# Patient Record
Sex: FEMALE | Race: OTHER | NOT HISPANIC OR LATINO | Employment: UNEMPLOYED | URBAN - METROPOLITAN AREA
[De-identification: names, ages, dates, MRNs, and addresses within clinical notes are randomized per-mention and may not be internally consistent; named-entity substitution may affect disease eponyms.]

---

## 2024-05-23 ENCOUNTER — OFFICE VISIT (OUTPATIENT)
Dept: PRIMARY CARE | Facility: CLINIC | Age: 34
End: 2024-05-23
Payer: COMMERCIAL

## 2024-05-23 VITALS
DIASTOLIC BLOOD PRESSURE: 70 MMHG | HEART RATE: 98 BPM | WEIGHT: 206.5 LBS | TEMPERATURE: 98.7 F | SYSTOLIC BLOOD PRESSURE: 108 MMHG | BODY MASS INDEX: 35.26 KG/M2 | HEIGHT: 64 IN

## 2024-05-23 DIAGNOSIS — L91.8 CUTANEOUS TAG: ICD-10-CM

## 2024-05-23 DIAGNOSIS — Z76.89 ESTABLISHING CARE WITH NEW DOCTOR, ENCOUNTER FOR: Primary | ICD-10-CM

## 2024-05-23 DIAGNOSIS — Z30.011 BCP (BIRTH CONTROL PILLS) INITIATION: ICD-10-CM

## 2024-05-23 PROCEDURE — 1036F TOBACCO NON-USER: CPT | Performed by: INTERNAL MEDICINE

## 2024-05-23 PROCEDURE — 36415 COLL VENOUS BLD VENIPUNCTURE: CPT

## 2024-05-23 PROCEDURE — 85027 COMPLETE CBC AUTOMATED: CPT

## 2024-05-23 PROCEDURE — 84443 ASSAY THYROID STIM HORMONE: CPT

## 2024-05-23 PROCEDURE — 80053 COMPREHEN METABOLIC PANEL: CPT

## 2024-05-23 PROCEDURE — 99214 OFFICE O/P EST MOD 30 MIN: CPT | Performed by: INTERNAL MEDICINE

## 2024-05-23 PROCEDURE — 80061 LIPID PANEL: CPT

## 2024-05-23 ASSESSMENT — PATIENT HEALTH QUESTIONNAIRE - PHQ9
SUM OF ALL RESPONSES TO PHQ9 QUESTIONS 1 AND 2: 0
2. FEELING DOWN, DEPRESSED OR HOPELESS: NOT AT ALL
1. LITTLE INTEREST OR PLEASURE IN DOING THINGS: NOT AT ALL

## 2024-05-23 ASSESSMENT — ENCOUNTER SYMPTOMS
LOSS OF SENSATION IN FEET: 0
OCCASIONAL FEELINGS OF UNSTEADINESS: 0
DEPRESSION: 0

## 2024-05-23 NOTE — PROGRESS NOTES
"Subjective   Patient ID: Fatou Go is a 34 y.o. female who presents for Establish Care (Patient is here for itching of bilateral forearms x 3 weeks.).    HPI   34 years old female from [facility] originally comes in to see me for the first time in this office.  She has no past medical or surgical history.  Does not take any prescription medications.  No known allergies.  Non-smoker and no alcohol intake.  No jobs or work.   with 1 son and 2 daughters.   works for real estate.  Mother with blood pressure diabetes mellitus and high lipids.  Father with hypertension.  1 sister and 1 brother in good health.  Complaining of skin rash and itching on her arms most likely rash from the sun exposure to the sun.  Skin tags around her neck.  Needs referral to see OB/GYN  Review of Systems  12 system reviewed 12 system are negative.  Objective   /70 (BP Location: Right arm, Patient Position: Sitting, BP Cuff Size: Large adult)   Pulse 98   Temp 37.1 °C (98.7 °F) (Oral)   Ht 1.625 m (5' 3.98\")   Wt 93.7 kg (206 lb 8 oz)   BMI 35.47 kg/m²     Physical Exam  Alert oriented in no acute distress nonicteric sclera no jaundice.  Neck supple no masses no lymph nodes or thyromegaly.  Lungs clear no rales wheezing or crackles.  Heart normal S1 and S2 regular rhythm.  Abdomen benign neurologically intact  Leaving [facility] on vacation to [place].  Need to come back when she comes back from [facility] to remove her skin tags.  Referral to see OB/GYN provided  Assessment/Plan   Diagnoses and all orders for this visit:  Establishing care with new doctor, encounter for  -     CBC  -     Comprehensive Metabolic Panel  -     Lipid Panel  -     Thyroid Stimulating Hormone  BCP (birth control pills) initiation  -     Referral to Obstetrics / Gynecology; Future  Cutaneous tag         "

## 2024-05-24 LAB
ALBUMIN SERPL BCP-MCNC: 4.2 G/DL (ref 3.4–5)
ALP SERPL-CCNC: 93 U/L (ref 33–110)
ALT SERPL W P-5'-P-CCNC: 11 U/L (ref 7–45)
ANION GAP SERPL CALC-SCNC: 16 MMOL/L (ref 10–20)
AST SERPL W P-5'-P-CCNC: 13 U/L (ref 9–39)
BILIRUB SERPL-MCNC: 0.3 MG/DL (ref 0–1.2)
BUN SERPL-MCNC: 13 MG/DL (ref 6–23)
CALCIUM SERPL-MCNC: 9.1 MG/DL (ref 8.6–10.6)
CHLORIDE SERPL-SCNC: 103 MMOL/L (ref 98–107)
CHOLEST SERPL-MCNC: 177 MG/DL (ref 0–199)
CHOLESTEROL/HDL RATIO: 4
CO2 SERPL-SCNC: 23 MMOL/L (ref 21–32)
CREAT SERPL-MCNC: 0.65 MG/DL (ref 0.5–1.05)
EGFRCR SERPLBLD CKD-EPI 2021: >90 ML/MIN/1.73M*2
ERYTHROCYTE [DISTWIDTH] IN BLOOD BY AUTOMATED COUNT: 16.3 % (ref 11.5–14.5)
GLUCOSE SERPL-MCNC: 148 MG/DL (ref 74–99)
HCT VFR BLD AUTO: 35.1 % (ref 36–46)
HDLC SERPL-MCNC: 44.3 MG/DL
HGB BLD-MCNC: 10.4 G/DL (ref 12–16)
LDLC SERPL CALC-MCNC: 100 MG/DL
MCH RBC QN AUTO: 22.7 PG (ref 26–34)
MCHC RBC AUTO-ENTMCNC: 29.6 G/DL (ref 32–36)
MCV RBC AUTO: 77 FL (ref 80–100)
NON HDL CHOLESTEROL: 133 MG/DL (ref 0–149)
NRBC BLD-RTO: 0 /100 WBCS (ref 0–0)
PLATELET # BLD AUTO: 325 X10*3/UL (ref 150–450)
POTASSIUM SERPL-SCNC: 4 MMOL/L (ref 3.5–5.3)
PROT SERPL-MCNC: 7.5 G/DL (ref 6.4–8.2)
RBC # BLD AUTO: 4.58 X10*6/UL (ref 4–5.2)
SODIUM SERPL-SCNC: 138 MMOL/L (ref 136–145)
TRIGL SERPL-MCNC: 163 MG/DL (ref 0–149)
TSH SERPL-ACNC: 3.55 MIU/L (ref 0.44–3.98)
VLDL: 33 MG/DL (ref 0–40)
WBC # BLD AUTO: 10.5 X10*3/UL (ref 4.4–11.3)

## 2024-05-25 ENCOUNTER — TELEPHONE (OUTPATIENT)
Dept: PRIMARY CARE | Facility: CLINIC | Age: 34
End: 2024-05-25
Payer: COMMERCIAL

## 2024-05-25 DIAGNOSIS — D50.0 IRON DEFICIENCY ANEMIA DUE TO CHRONIC BLOOD LOSS: Primary | ICD-10-CM

## 2024-05-25 RX ORDER — FERROUS GLUCONATE 324(38)MG
38 TABLET ORAL
Qty: 270 TABLET | Refills: 3 | Status: SHIPPED | OUTPATIENT
Start: 2024-05-25 | End: 2025-05-25

## 2024-05-25 NOTE — TELEPHONE ENCOUNTER
I called the patient today with lab results and advised her to increase her iron intake 3 times a day prescription written and sent to her pharmacy today.  [facility] pharmacy in [place].  Blood sugar elevated need to be checked fasting A1c and urinalysis to rule out diabetes.  She understand that we will recheck her CBC and iron level

## 2024-05-30 ENCOUNTER — OFFICE VISIT (OUTPATIENT)
Dept: PRIMARY CARE | Facility: CLINIC | Age: 34
End: 2024-05-30
Payer: COMMERCIAL

## 2024-05-30 VITALS
OXYGEN SATURATION: 98 % | HEART RATE: 85 BPM | DIASTOLIC BLOOD PRESSURE: 73 MMHG | TEMPERATURE: 98.3 F | BODY MASS INDEX: 35.39 KG/M2 | SYSTOLIC BLOOD PRESSURE: 106 MMHG | WEIGHT: 206 LBS

## 2024-05-30 DIAGNOSIS — E11.00 TYPE 2 DIABETES MELLITUS WITH HYPEROSMOLARITY WITHOUT COMA, WITHOUT LONG-TERM CURRENT USE OF INSULIN (MULTI): Primary | ICD-10-CM

## 2024-05-30 DIAGNOSIS — D50.0 IRON DEFICIENCY ANEMIA DUE TO CHRONIC BLOOD LOSS: ICD-10-CM

## 2024-05-30 LAB
APPEARANCE UR: CLEAR
BILIRUB UR QL STRIP: NEGATIVE
COLOR UR: YELLOW
GLUCOSE UR STRIP-MCNC: NEGATIVE MG/DL
HGB UR QL STRIP: ABNORMAL
IRON SATN MFR SERPL: 8 % (ref 25–45)
IRON SERPL-MCNC: 38 UG/DL (ref 35–150)
KETONES UR STRIP-MCNC: NEGATIVE MG/DL
LEUKOCYTE ESTERASE UR QL STRIP: ABNORMAL
NITRITE UR QL STRIP: NEGATIVE
PH UR STRIP: 5 [PH]
POC FINGERSTICK BLOOD GLUCOSE: 54 MG/DL (ref 70–100)
PROT UR STRIP-MCNC: NEGATIVE MG/DL
SP GR UR STRIP.AUTO: 1.02
TIBC SERPL-MCNC: 457 UG/DL (ref 240–445)
UIBC SERPL-MCNC: 419 UG/DL (ref 110–370)
UROBILINOGEN UR STRIP-ACNC: 0.2 E.U./DL

## 2024-05-30 PROCEDURE — 81003 URINALYSIS AUTO W/O SCOPE: CPT | Performed by: INTERNAL MEDICINE

## 2024-05-30 PROCEDURE — 3078F DIAST BP <80 MM HG: CPT | Performed by: INTERNAL MEDICINE

## 2024-05-30 PROCEDURE — 1036F TOBACCO NON-USER: CPT | Performed by: INTERNAL MEDICINE

## 2024-05-30 PROCEDURE — 82962 GLUCOSE BLOOD TEST: CPT | Performed by: INTERNAL MEDICINE

## 2024-05-30 PROCEDURE — 3074F SYST BP LT 130 MM HG: CPT | Performed by: INTERNAL MEDICINE

## 2024-05-30 PROCEDURE — 83540 ASSAY OF IRON: CPT

## 2024-05-30 PROCEDURE — 83550 IRON BINDING TEST: CPT

## 2024-05-30 PROCEDURE — 99213 OFFICE O/P EST LOW 20 MIN: CPT | Performed by: INTERNAL MEDICINE

## 2024-05-30 PROCEDURE — 83036 HEMOGLOBIN GLYCOSYLATED A1C: CPT | Performed by: INTERNAL MEDICINE

## 2024-05-30 PROCEDURE — 3049F LDL-C 100-129 MG/DL: CPT | Performed by: INTERNAL MEDICINE

## 2024-05-30 PROCEDURE — 36415 COLL VENOUS BLD VENIPUNCTURE: CPT

## 2024-05-30 ASSESSMENT — PATIENT HEALTH QUESTIONNAIRE - PHQ9
2. FEELING DOWN, DEPRESSED OR HOPELESS: NOT AT ALL
1. LITTLE INTEREST OR PLEASURE IN DOING THINGS: NOT AT ALL
SUM OF ALL RESPONSES TO PHQ9 QUESTIONS 1 AND 2: 0

## 2024-05-30 ASSESSMENT — ENCOUNTER SYMPTOMS
DEPRESSION: 0
OCCASIONAL FEELINGS OF UNSTEADINESS: 0
LOSS OF SENSATION IN FEET: 0

## 2024-05-30 ASSESSMENT — PAIN SCALES - GENERAL: PAINLEVEL: 0-NO PAIN

## 2024-05-30 NOTE — PROGRESS NOTES
Subjective   Patient ID: Fatou Go is a 34 y.o. female who presents for Follow-up.    HPI   34 years old female recently seen and examined comes back today to take care of her skin tags around her neck and also to recheck few things which showed up on her labs.  She was anemic and she had elevated blood sugar.  Today we will do her iron panel and check her blood sugar with urinalysis and A1c.  Review of Systems  12 system review 12 system are negative.  Objective   /73 (BP Location: Right arm, Patient Position: Sitting, BP Cuff Size: Large adult)   Pulse 85   Temp 36.8 °C (98.3 °F) (Oral)   Wt 93.4 kg (206 lb)   SpO2 98%   BMI 35.39 kg/m²     Physical Exam  Alert oriented in no acute distress nonicteric sclera no jaundice.  Neck supple no masses no lymph node no thyromegaly or jugular venous distention.  Face symmetrical.  Lungs clear no rales wheezing or crackles.  Heart normal S1 and S2 regular rhythm.  Abdomen benign neurologically intact.  I tried to cauterize her skin tags but she was not able to tolerate that procedure so after 1 trial for a minute.  Procedure was aborted and the referral to see dermatology was done  Assessment/Plan   Diagnoses and all orders for this visit:  Type 2 diabetes mellitus with hyperosmolarity without coma, without long-term current use of insulin (Multi)  -     POCT fingerstick glucose manually resulted  -     POCT UA (Automated) docked device  -     Hemoglobin A1c  Iron deficiency anemia due to chronic blood loss  -     Iron and TIBC  Other orders  -     POCT URINALYSIS AUTOMATED

## 2024-06-05 ENCOUNTER — TELEPHONE (OUTPATIENT)
Dept: PRIMARY CARE | Facility: CLINIC | Age: 34
End: 2024-06-05
Payer: COMMERCIAL

## 2024-06-05 NOTE — TELEPHONE ENCOUNTER
I called the patient yesterday on June 4 I did not do and informed her on her lab results.  She is to take iron supplement 3 times a day.  Prescription done.  She is not diabetic.  Follow-up with dermatology for the skin tag to be removed

## 2024-06-21 ENCOUNTER — HOSPITAL ENCOUNTER (EMERGENCY)
Facility: HOSPITAL | Age: 34
Discharge: HOME | End: 2024-06-21
Payer: COMMERCIAL

## 2024-06-21 ENCOUNTER — APPOINTMENT (OUTPATIENT)
Dept: RADIOLOGY | Facility: HOSPITAL | Age: 34
End: 2024-06-21
Payer: COMMERCIAL

## 2024-06-21 VITALS
OXYGEN SATURATION: 97 % | DIASTOLIC BLOOD PRESSURE: 83 MMHG | SYSTOLIC BLOOD PRESSURE: 113 MMHG | RESPIRATION RATE: 19 BRPM | BODY MASS INDEX: 35.85 KG/M2 | TEMPERATURE: 97.9 F | HEART RATE: 89 BPM | WEIGHT: 210 LBS | HEIGHT: 64 IN

## 2024-06-21 DIAGNOSIS — N30.00 ACUTE CYSTITIS WITHOUT HEMATURIA: ICD-10-CM

## 2024-06-21 DIAGNOSIS — J03.00 STREP TONSILLITIS: Primary | ICD-10-CM

## 2024-06-21 LAB
ANION GAP SERPL CALC-SCNC: 12 MMOL/L
APPEARANCE UR: CLEAR
BACTERIA #/AREA URNS AUTO: ABNORMAL /HPF
BASOPHILS # BLD AUTO: 0.06 X10*3/UL (ref 0–0.1)
BASOPHILS NFR BLD AUTO: 0.4 %
BILIRUB UR STRIP.AUTO-MCNC: NEGATIVE MG/DL
BUN SERPL-MCNC: 7 MG/DL (ref 8–25)
CALCIUM SERPL-MCNC: 8.9 MG/DL (ref 8.5–10.4)
CHLORIDE SERPL-SCNC: 102 MMOL/L (ref 97–107)
CO2 SERPL-SCNC: 23 MMOL/L (ref 24–31)
COLOR UR: ABNORMAL
CREAT SERPL-MCNC: 0.6 MG/DL (ref 0.4–1.6)
EGFRCR SERPLBLD CKD-EPI 2021: >90 ML/MIN/1.73M*2
EOSINOPHIL # BLD AUTO: 0.09 X10*3/UL (ref 0–0.7)
EOSINOPHIL NFR BLD AUTO: 0.6 %
ERYTHROCYTE [DISTWIDTH] IN BLOOD BY AUTOMATED COUNT: 17.4 % (ref 11.5–14.5)
FLUAV RNA RESP QL NAA+PROBE: NOT DETECTED
FLUBV RNA RESP QL NAA+PROBE: NOT DETECTED
GLUCOSE SERPL-MCNC: 98 MG/DL (ref 65–99)
GLUCOSE UR STRIP.AUTO-MCNC: NORMAL MG/DL
HCG UR QL IA.RAPID: NEGATIVE
HCT VFR BLD AUTO: 35 % (ref 36–46)
HGB BLD-MCNC: 10.8 G/DL (ref 12–16)
HOLD SPECIMEN: NORMAL
IMM GRANULOCYTES # BLD AUTO: 0.08 X10*3/UL (ref 0–0.7)
IMM GRANULOCYTES NFR BLD AUTO: 0.5 % (ref 0–0.9)
KETONES UR STRIP.AUTO-MCNC: NEGATIVE MG/DL
LEUKOCYTE ESTERASE UR QL STRIP.AUTO: ABNORMAL
LYMPHOCYTES # BLD AUTO: 1.22 X10*3/UL (ref 1.2–4.8)
LYMPHOCYTES NFR BLD AUTO: 8 %
MCH RBC QN AUTO: 23.6 PG (ref 26–34)
MCHC RBC AUTO-ENTMCNC: 30.9 G/DL (ref 32–36)
MCV RBC AUTO: 77 FL (ref 80–100)
MONOCYTES # BLD AUTO: 0.63 X10*3/UL (ref 0.1–1)
MONOCYTES NFR BLD AUTO: 4.1 %
MUCOUS THREADS #/AREA URNS AUTO: ABNORMAL /LPF
NEUTROPHILS # BLD AUTO: 13.25 X10*3/UL (ref 1.2–7.7)
NEUTROPHILS NFR BLD AUTO: 86.4 %
NITRITE UR QL STRIP.AUTO: NEGATIVE
NRBC BLD-RTO: 0 /100 WBCS (ref 0–0)
PH UR STRIP.AUTO: 6 [PH]
PLATELET # BLD AUTO: 296 X10*3/UL (ref 150–450)
POTASSIUM SERPL-SCNC: 3.6 MMOL/L (ref 3.4–5.1)
PROT UR STRIP.AUTO-MCNC: NEGATIVE MG/DL
RBC # BLD AUTO: 4.57 X10*6/UL (ref 4–5.2)
RBC # UR STRIP.AUTO: ABNORMAL /UL
RBC #/AREA URNS AUTO: ABNORMAL /HPF
S PYO DNA THROAT QL NAA+PROBE: DETECTED
SARS-COV-2 RNA RESP QL NAA+PROBE: NOT DETECTED
SODIUM SERPL-SCNC: 137 MMOL/L (ref 133–145)
SP GR UR STRIP.AUTO: 1.01
SQUAMOUS #/AREA URNS AUTO: ABNORMAL /HPF
UROBILINOGEN UR STRIP.AUTO-MCNC: NORMAL MG/DL
WBC # BLD AUTO: 15.3 X10*3/UL (ref 4.4–11.3)
WBC #/AREA URNS AUTO: ABNORMAL /HPF

## 2024-06-21 PROCEDURE — 80048 BASIC METABOLIC PNL TOTAL CA: CPT

## 2024-06-21 PROCEDURE — 71046 X-RAY EXAM CHEST 2 VIEWS: CPT | Performed by: RADIOLOGY

## 2024-06-21 PROCEDURE — 96361 HYDRATE IV INFUSION ADD-ON: CPT

## 2024-06-21 PROCEDURE — 87086 URINE CULTURE/COLONY COUNT: CPT | Mod: WESLAB

## 2024-06-21 PROCEDURE — 87651 STREP A DNA AMP PROBE: CPT

## 2024-06-21 PROCEDURE — 96374 THER/PROPH/DIAG INJ IV PUSH: CPT

## 2024-06-21 PROCEDURE — 2500000004 HC RX 250 GENERAL PHARMACY W/ HCPCS (ALT 636 FOR OP/ED)

## 2024-06-21 PROCEDURE — 81025 URINE PREGNANCY TEST: CPT

## 2024-06-21 PROCEDURE — 85025 COMPLETE CBC W/AUTO DIFF WBC: CPT

## 2024-06-21 PROCEDURE — 87502 INFLUENZA DNA AMP PROBE: CPT

## 2024-06-21 PROCEDURE — 96375 TX/PRO/DX INJ NEW DRUG ADDON: CPT

## 2024-06-21 PROCEDURE — 36415 COLL VENOUS BLD VENIPUNCTURE: CPT

## 2024-06-21 PROCEDURE — 99284 EMERGENCY DEPT VISIT MOD MDM: CPT | Mod: 25

## 2024-06-21 PROCEDURE — 81001 URINALYSIS AUTO W/SCOPE: CPT

## 2024-06-21 PROCEDURE — 71046 X-RAY EXAM CHEST 2 VIEWS: CPT

## 2024-06-21 RX ORDER — KETOROLAC TROMETHAMINE 30 MG/ML
15 INJECTION, SOLUTION INTRAMUSCULAR; INTRAVENOUS ONCE
Status: COMPLETED | OUTPATIENT
Start: 2024-06-21 | End: 2024-06-21

## 2024-06-21 RX ORDER — AMOXICILLIN 875 MG/1
875 TABLET, FILM COATED ORAL 2 TIMES DAILY
Qty: 14 TABLET | Refills: 0 | Status: SHIPPED | OUTPATIENT
Start: 2024-06-21 | End: 2024-06-28

## 2024-06-21 RX ORDER — DEXAMETHASONE SODIUM PHOSPHATE 100 MG/10ML
10 INJECTION INTRAMUSCULAR; INTRAVENOUS ONCE
Status: COMPLETED | OUTPATIENT
Start: 2024-06-21 | End: 2024-06-21

## 2024-06-21 ASSESSMENT — COLUMBIA-SUICIDE SEVERITY RATING SCALE - C-SSRS
1. IN THE PAST MONTH, HAVE YOU WISHED YOU WERE DEAD OR WISHED YOU COULD GO TO SLEEP AND NOT WAKE UP?: NO
2. HAVE YOU ACTUALLY HAD ANY THOUGHTS OF KILLING YOURSELF?: NO
6. HAVE YOU EVER DONE ANYTHING, STARTED TO DO ANYTHING, OR PREPARED TO DO ANYTHING TO END YOUR LIFE?: NO

## 2024-06-21 NOTE — DISCHARGE INSTRUCTIONS
Follow-up closely with primary care provider in the following 1 to 2 days for recheck of symptoms.    Take and complete course of antibiotics as prescribed for strep throat and urinary tract infection.    Increase fluids to prevent dehydration.  Continue over-the-counter cold and flu medications help with symptoms.  Continue Tylenol and ibuprofen to help with aches and pains.

## 2024-06-21 NOTE — ED PROVIDER NOTES
HPI   Chief Complaint   Patient presents with    Generalized Body Aches    Sore Throat     Presents to ED via EMS for body aches, cough and sore throat since last night.       Patient is a 34-year-old female presents emergency department for evaluation of generalized bodyaches, cough, and sore throat beginning last night.  Patient states that she is relatively healthy individual does not take any daily medications.  She states that yesterday she took some Claritin and Tylenol to help with her symptoms.  She states that it hurts to swallow and feels like her tonsils are swollen.  She has widespread body aches and all of her joints and notes that she feels just very fatigued generalized malaise.  She states that she has been coughing as well.  She states that she just feels mildly dizzy.  She denies any chest pain or shortness of breath.  She denies any fevers, nausea, vomiting, abdominal pain, urinary symptoms, changes in bowel movements.      History provided by:  Patient   used: No                        Danilo Coma Scale Score: 15                     Patient History   Past Medical History:   Diagnosis Date    Encounter for full-term uncomplicated delivery ([facility])      (spontaneous vaginal delivery)     No past surgical history on file.  No family history on file.  Social History     Tobacco Use    Smoking status: Never    Smokeless tobacco: Never   Substance Use Topics    Alcohol use: Never    Drug use: Never       Physical Exam   ED Triage Vitals [24 1110]   Temperature Heart Rate Respirations BP   36.6 °C (97.9 °F) (!) 106 19 113/83      Pulse Ox Temp Source Heart Rate Source Patient Position   97 % Temporal Monitor Sitting      BP Location FiO2 (%)     Right arm --       Physical Exam  Constitutional:       Appearance: She is well-developed.   HENT:      Head: Normocephalic and atraumatic.      Mouth/Throat:      Mouth: Mucous membranes are moist.      Pharynx: Uvula midline.       Tonsils: Tonsillar exudate present. 1+ on the right. 1+ on the left.   Cardiovascular:      Rate and Rhythm: Regular rhythm. Tachycardia present.   Pulmonary:      Effort: Pulmonary effort is normal.      Breath sounds: Normal breath sounds. No wheezing or rales.   Abdominal:      General: Bowel sounds are normal.      Palpations: Abdomen is soft.      Tenderness: There is no abdominal tenderness.   Skin:     General: Skin is warm and dry.   Neurological:      Mental Status: She is alert.         ED Course & MDM   Diagnoses as of 06/21/24 1349   Strep tonsillitis   Acute cystitis without hematuria       Medical Decision Making  Patient is a 34-year-old female presents emergency department for evaluation of generalized bodyaches, sore throat, and cough starting last night.    Lab work done today included BMP, CBC, urinalysis, urine pregnancy, strep test, and flu/COVID swabs.  Lab work with leukocytosis, strep test positive, and evidence of urinary tract infection with mild anemia.    Scans done today were interpreted/confirmed by radiologist and also interpreted by me which included chest x-ray.  Chest x-ray shows no acute cardiopulmonary process.    Medications given at today's visit include IV fluids, IV Toradol, IV Decadron    I saw this patient independently.  Patient remained stable while in the emergency department and had significant improvement of vital signs.  She feels much improved with medications and fluids given emergency department.  She able to tolerate oral intake without any difficulty.  She is oxygenating well on room air.  She does test positive today for strep throat which I suspect is the source of patient's symptoms today.  She is educated on continued symptom management and given a dose of Decadron to help.  She will be started on oral antibiotics for treatment of strep tonsillitis along with treatment of urinary tract infection.  She was educated to follow-up closely with primary care  provider in the following week for recheck of symptoms.  She is agreeable to plan and discharge at this time.    All labs, imaging, and diagnostic studies were reviewed by me and patient was counseled on clinical impression, expectations, and plan.  Patient was educated to follow-up with PCP in the following 1-2 days.  All questions from patient were answered. They elicited understanding and were agreeable to course of treatment.  Patient was discharged in stable condition and given strict return precautions.    Prescriptions given on discharge: PO amoxicillin    ** Disclaimer:  Parts of this document were written utilizing a voice to text dictation software.  Note may contain minor transcription or typographical errors that were inadvertently transcribed by the computer software.        Procedure  Procedures     Lorraine Mims PA-C  06/21/24 3674

## 2024-06-22 LAB — BACTERIA UR CULT: NORMAL

## 2024-06-25 ENCOUNTER — APPOINTMENT (OUTPATIENT)
Dept: OBSTETRICS AND GYNECOLOGY | Facility: CLINIC | Age: 34
End: 2024-06-25
Payer: COMMERCIAL

## 2024-06-25 VITALS
WEIGHT: 207 LBS | HEIGHT: 65 IN | SYSTOLIC BLOOD PRESSURE: 109 MMHG | DIASTOLIC BLOOD PRESSURE: 69 MMHG | BODY MASS INDEX: 34.49 KG/M2

## 2024-06-25 DIAGNOSIS — Z30.011 BCP (BIRTH CONTROL PILLS) INITIATION: ICD-10-CM

## 2024-06-25 DIAGNOSIS — Z30.017 NEXPLANON INSERTION: Primary | ICD-10-CM

## 2024-06-25 PROCEDURE — 99203 OFFICE O/P NEW LOW 30 MIN: CPT | Performed by: ADVANCED PRACTICE MIDWIFE

## 2024-06-25 PROCEDURE — 81025 URINE PREGNANCY TEST: CPT | Performed by: ADVANCED PRACTICE MIDWIFE

## 2024-06-25 PROCEDURE — 11981 INSERTION DRUG DLVR IMPLANT: CPT | Performed by: ADVANCED PRACTICE MIDWIFE

## 2024-06-25 NOTE — PROGRESS NOTES
"Assessment/Plan   Risks, benefits, and expected side effects of available hormonal contraception methods were discussed, including IUDs, Nexplanon, Depo-Provera, vaginal ring, contraception patch, COCs, and POPs. Non-hormonal contraception methods including copper IUD, Phexxi, barrier methods, and fertility awareness were also discussed.     Pt is interested in Nexplanon to be placed today. Denies possibility of current pregnancy. Will plan urine HCG today.       Fatou was seen today for contraception.  Diagnoses and all orders for this visit:  BCP (birth control pills) initiation  -     Referral to Obstetrics / Gynecology       SHIMON Rogers-BASHIR    Subjective   Fatou Go is a 34 y.o. female who presents for contraception counseling. She is not interested in JAMARI as she has had bad experiences in the past with weight gain and mood instability on JAMARI. She is interested in a LARC. Discussed all options. Desires Nexplanon to be placed today.     Sexual Activity: sexually active, male partners; Patient reports 1 partners in the last 12 months.    Pertinent past medical history: none.    Menstrual History:  OB History          3    Para   3    Term   3            AB        Living   3         SAB        IAB        Ectopic        Multiple        Live Births   3                Menarche age: 14  Patient's last menstrual period was 2024 (exact date).         Objective   /69   Ht 1.651 m (5' 5\")   Wt 93.9 kg (207 lb)   LMP 2024 (Exact Date)   BMI 34.45 kg/m²     Physical Exam  Constitutional:       Appearance: Normal appearance.   Cardiovascular:      Rate and Rhythm: Regular rhythm.   Pulmonary:      Effort: Pulmonary effort is normal.      Breath sounds: Normal breath sounds.   Musculoskeletal:         General: Normal range of motion.      Cervical back: Normal range of motion.   Neurological:      General: No focal deficit present.      Mental Status: She is alert and " oriented to person, place, and time.   Skin:     General: Skin is warm and dry.           Lab Review  UPT negative     Subdermal Contraceptive Implant Insertion Note  Fatou Go desires a subdermal etonogestrel contraceptive implant insertion. She has been counseled regarding the risks, benefits and alternatives to the implant. She especially understands that her menstrual periods are expected to become irregular and unpredictable throughout the time she is using the implant. She has no contraindications to the insertion. Her questions have been answered. She has fully reviewed the FDA-approved consent brochure, has signed the consent form, and wishes to proceed with the insertion today.     Current method of contraception:  no method    Patient's last menstrual period was 06/24/2024 (exact date).    Urine pregnancy test: negative    Procedure Details  The inner side of the left arm was cleaned with Betadinex3 and infiltrated with 1% lidocaine. The contraceptive suzy was inserted according to the 's instructions without complications. The suzy was palpable under the skin after the insertion. The insertion site was closed with Band-Aid and a pressure dressing was applied.     Successful insertion of nexplanon device.    Fatou was given post-insertion instructions. She understands that the implant must be removed at the end of 3 years and may be removed sooner if she wishes.    Other follow-up needed:  F/U in 3 months for annual exam with BC check     CHRIS Rogers

## 2024-06-26 LAB — PREGNANCY TEST URINE, POC: NEGATIVE

## 2024-07-30 ENCOUNTER — APPOINTMENT (OUTPATIENT)
Dept: OBSTETRICS AND GYNECOLOGY | Facility: CLINIC | Age: 34
End: 2024-07-30
Payer: COMMERCIAL

## 2024-07-30 VITALS
DIASTOLIC BLOOD PRESSURE: 67 MMHG | WEIGHT: 214 LBS | HEIGHT: 65 IN | BODY MASS INDEX: 35.65 KG/M2 | SYSTOLIC BLOOD PRESSURE: 103 MMHG

## 2024-07-30 DIAGNOSIS — Z20.2 POSSIBLE EXPOSURE TO STD: Primary | ICD-10-CM

## 2024-07-30 DIAGNOSIS — Z12.4 PAP SMEAR FOR CERVICAL CANCER SCREENING: ICD-10-CM

## 2024-07-30 PROCEDURE — 1036F TOBACCO NON-USER: CPT | Performed by: ADVANCED PRACTICE MIDWIFE

## 2024-07-30 PROCEDURE — 87491 CHLMYD TRACH DNA AMP PROBE: CPT

## 2024-07-30 PROCEDURE — 87661 TRICHOMONAS VAGINALIS AMPLIF: CPT

## 2024-07-30 PROCEDURE — 36415 COLL VENOUS BLD VENIPUNCTURE: CPT

## 2024-07-30 PROCEDURE — 99395 PREV VISIT EST AGE 18-39: CPT | Performed by: ADVANCED PRACTICE MIDWIFE

## 2024-07-30 PROCEDURE — 87591 N.GONORRHOEAE DNA AMP PROB: CPT

## 2024-07-30 PROCEDURE — 3008F BODY MASS INDEX DOCD: CPT | Performed by: ADVANCED PRACTICE MIDWIFE

## 2024-07-30 PROCEDURE — 87624 HPV HI-RISK TYP POOLED RSLT: CPT

## 2024-07-30 RX ORDER — ETONOGESTREL 68 MG/1
1 IMPLANT SUBCUTANEOUS ONCE
COMMUNITY

## 2024-07-30 ASSESSMENT — PATIENT HEALTH QUESTIONNAIRE - PHQ9
1. LITTLE INTEREST OR PLEASURE IN DOING THINGS: NOT AT ALL
2. FEELING DOWN, DEPRESSED OR HOPELESS: NOT AT ALL
SUM OF ALL RESPONSES TO PHQ9 QUESTIONS 1 AND 2: 0

## 2024-07-30 NOTE — PROGRESS NOTES
"Assessment/Plan   Problem List Items Addressed This Visit    None      Catherine Pino, APRN-CNM     Subjective   Fatou Go is a 34 y.o. female who is here for a routine exam.     Concerns today:  none    No LMP recorded. Patient has had an implant.   Periods are irregular, lasting  has nexplanon in place  days.     Sexual Activity: sexually active, male partners; Patient reports 1 partners in the last 12 months.  Pain with intercourse? occasionally  Loss of desire? No   Able to have an orgasm? Yes     History of prior STI: none    Current contraception:  nexplanon    Last pap: 2017  History of abnormal Pap smear: no  Family history of uterine or ovarian cancer: no    Last mammogram: NA  Family history of breast cancer: no  Menstrual History:  OB History          3    Para   3    Term   3            AB        Living   3         SAB        IAB        Ectopic        Multiple        Live Births   3                Menarche age: 11  No LMP recorded. Patient has had an implant.       Objective   /67   Ht 1.651 m (5' 5\")   Wt 97.1 kg (214 lb)   BMI 35.61 kg/m²         Diet: real food    Exercise: walks daily  Physical Exam  Constitutional:       Appearance: Normal appearance.   Genitourinary:      Vulva and rectum normal.   HENT:      Head: Normocephalic.      Nose: Nose normal.      Mouth/Throat:      Mouth: Mucous membranes are moist.   Eyes:      Pupils: Pupils are equal, round, and reactive to light.   Cardiovascular:      Rate and Rhythm: Normal rate.      Pulses: Normal pulses.   Pulmonary:      Effort: Pulmonary effort is normal.   Abdominal:      General: Abdomen is flat.      Palpations: Abdomen is soft.   Musculoskeletal:         General: Normal range of motion.      Cervical back: Normal range of motion and neck supple.   Neurological:      General: No focal deficit present.      Mental Status: She is alert and oriented to person, place, and time.   Skin:     General: Skin is " warm.   Psychiatric:         Mood and Affect: Mood normal.         Behavior: Behavior normal. Behavior is cooperative.   Vitals reviewed.       Pap today  STD testing ordered as requested  Nexplanon in place

## 2024-08-01 LAB
C TRACH RRNA SPEC QL NAA+PROBE: NEGATIVE
N GONORRHOEA DNA SPEC QL PROBE+SIG AMP: NEGATIVE
T VAGINALIS RRNA SPEC QL NAA+PROBE: NEGATIVE

## 2024-08-08 LAB
CYTOLOGY CMNT CVX/VAG CYTO-IMP: NORMAL
HPV HR 12 DNA GENITAL QL NAA+PROBE: NEGATIVE
HPV HR GENOTYPES PNL CVX NAA+PROBE: NEGATIVE
HPV16 DNA SPEC QL NAA+PROBE: NEGATIVE
HPV18 DNA SPEC QL NAA+PROBE: NEGATIVE
LAB AP HPV GENOTYPE QUESTION: YES
LAB AP HPV HR: NORMAL
LAB AP PAP ADDITIONAL TESTS: NORMAL
LABORATORY COMMENT REPORT: NORMAL
PATH REPORT.TOTAL CANCER: NORMAL

## 2024-12-19 ENCOUNTER — HOSPITAL ENCOUNTER (EMERGENCY)
Facility: HOSPITAL | Age: 34
Discharge: HOME | End: 2024-12-20
Payer: COMMERCIAL

## 2024-12-19 VITALS
SYSTOLIC BLOOD PRESSURE: 127 MMHG | BODY MASS INDEX: 35.65 KG/M2 | HEART RATE: 93 BPM | TEMPERATURE: 98.1 F | RESPIRATION RATE: 16 BRPM | OXYGEN SATURATION: 98 % | DIASTOLIC BLOOD PRESSURE: 77 MMHG | WEIGHT: 214 LBS | HEIGHT: 65 IN

## 2024-12-19 DIAGNOSIS — R10.9 RIGHT LATERAL ABDOMINAL PAIN: Primary | ICD-10-CM

## 2024-12-19 LAB — PREGNANCY TEST URINE, POC: NEGATIVE

## 2024-12-19 PROCEDURE — 99285 EMERGENCY DEPT VISIT HI MDM: CPT

## 2024-12-19 PROCEDURE — 83690 ASSAY OF LIPASE: CPT

## 2024-12-19 PROCEDURE — 85025 COMPLETE CBC W/AUTO DIFF WBC: CPT

## 2024-12-19 PROCEDURE — 2500000001 HC RX 250 WO HCPCS SELF ADMINISTERED DRUGS (ALT 637 FOR MEDICARE OP): Mod: SE

## 2024-12-19 PROCEDURE — 80053 COMPREHEN METABOLIC PANEL: CPT

## 2024-12-19 PROCEDURE — 36415 COLL VENOUS BLD VENIPUNCTURE: CPT

## 2024-12-19 PROCEDURE — 87077 CULTURE AEROBIC IDENTIFY: CPT

## 2024-12-19 PROCEDURE — 81025 URINE PREGNANCY TEST: CPT

## 2024-12-19 PROCEDURE — 81001 URINALYSIS AUTO W/SCOPE: CPT

## 2024-12-19 RX ORDER — DICYCLOMINE HYDROCHLORIDE 10 MG/1
10 CAPSULE ORAL ONCE
Status: COMPLETED | OUTPATIENT
Start: 2024-12-19 | End: 2024-12-19

## 2024-12-19 RX ADMIN — DICYCLOMINE HYDROCHLORIDE 10 MG: 10 CAPSULE ORAL at 23:28

## 2024-12-19 ASSESSMENT — PAIN - FUNCTIONAL ASSESSMENT: PAIN_FUNCTIONAL_ASSESSMENT: 0-10

## 2024-12-19 ASSESSMENT — LIFESTYLE VARIABLES
HAVE YOU EVER FELT YOU SHOULD CUT DOWN ON YOUR DRINKING: NO
EVER HAD A DRINK FIRST THING IN THE MORNING TO STEADY YOUR NERVES TO GET RID OF A HANGOVER: NO
HAVE PEOPLE ANNOYED YOU BY CRITICIZING YOUR DRINKING: NO
TOTAL SCORE: 0
EVER FELT BAD OR GUILTY ABOUT YOUR DRINKING: NO

## 2024-12-19 ASSESSMENT — PAIN SCALES - GENERAL: PAINLEVEL_OUTOF10: 7

## 2024-12-19 ASSESSMENT — COLUMBIA-SUICIDE SEVERITY RATING SCALE - C-SSRS
6. HAVE YOU EVER DONE ANYTHING, STARTED TO DO ANYTHING, OR PREPARED TO DO ANYTHING TO END YOUR LIFE?: NO
2. HAVE YOU ACTUALLY HAD ANY THOUGHTS OF KILLING YOURSELF?: NO
1. IN THE PAST MONTH, HAVE YOU WISHED YOU WERE DEAD OR WISHED YOU COULD GO TO SLEEP AND NOT WAKE UP?: NO

## 2024-12-19 ASSESSMENT — PAIN DESCRIPTION - LOCATION: LOCATION: OTHER (COMMENT)

## 2024-12-19 ASSESSMENT — PAIN DESCRIPTION - ORIENTATION: ORIENTATION: RIGHT

## 2024-12-19 ASSESSMENT — PAIN DESCRIPTION - DESCRIPTORS: DESCRIPTORS: OTHER (COMMENT)

## 2024-12-19 ASSESSMENT — PAIN DESCRIPTION - FREQUENCY: FREQUENCY: CONSTANT/CONTINUOUS

## 2024-12-20 ENCOUNTER — APPOINTMENT (OUTPATIENT)
Dept: RADIOLOGY | Facility: HOSPITAL | Age: 34
End: 2024-12-20
Payer: COMMERCIAL

## 2024-12-20 LAB
ALBUMIN SERPL BCP-MCNC: 4.1 G/DL (ref 3.4–5)
ALP SERPL-CCNC: 82 U/L (ref 33–110)
ALT SERPL W P-5'-P-CCNC: 9 U/L (ref 7–45)
ANION GAP SERPL CALC-SCNC: 9 MMOL/L (ref 10–20)
APPEARANCE UR: ABNORMAL
AST SERPL W P-5'-P-CCNC: 11 U/L (ref 9–39)
BASOPHILS # BLD AUTO: 0.06 X10*3/UL (ref 0–0.1)
BASOPHILS NFR BLD AUTO: 0.5 %
BILIRUB SERPL-MCNC: 0.3 MG/DL (ref 0–1.2)
BILIRUB UR STRIP.AUTO-MCNC: NEGATIVE MG/DL
BUN SERPL-MCNC: 9 MG/DL (ref 6–23)
CALCIUM SERPL-MCNC: 9 MG/DL (ref 8.6–10.6)
CHLORIDE SERPL-SCNC: 105 MMOL/L (ref 98–107)
CO2 SERPL-SCNC: 26 MMOL/L (ref 21–32)
COLOR UR: YELLOW
CREAT SERPL-MCNC: 0.7 MG/DL (ref 0.5–1.05)
EGFRCR SERPLBLD CKD-EPI 2021: >90 ML/MIN/1.73M*2
EOSINOPHIL # BLD AUTO: 0.19 X10*3/UL (ref 0–0.7)
EOSINOPHIL NFR BLD AUTO: 1.4 %
ERYTHROCYTE [DISTWIDTH] IN BLOOD BY AUTOMATED COUNT: 14.9 % (ref 11.5–14.5)
GLUCOSE SERPL-MCNC: 167 MG/DL (ref 74–99)
GLUCOSE UR STRIP.AUTO-MCNC: ABNORMAL MG/DL
HCT VFR BLD AUTO: 31.7 % (ref 36–46)
HGB BLD-MCNC: 10.3 G/DL (ref 12–16)
HOLD SPECIMEN: NORMAL
HOLD SPECIMEN: NORMAL
IMM GRANULOCYTES # BLD AUTO: 0.11 X10*3/UL (ref 0–0.7)
IMM GRANULOCYTES NFR BLD AUTO: 0.8 % (ref 0–0.9)
KETONES UR STRIP.AUTO-MCNC: ABNORMAL MG/DL
LEUKOCYTE ESTERASE UR QL STRIP.AUTO: ABNORMAL
LIPASE SERPL-CCNC: 13 U/L (ref 9–82)
LYMPHOCYTES # BLD AUTO: 3.25 X10*3/UL (ref 1.2–4.8)
LYMPHOCYTES NFR BLD AUTO: 24.8 %
MCH RBC QN AUTO: 24.9 PG (ref 26–34)
MCHC RBC AUTO-ENTMCNC: 32.5 G/DL (ref 32–36)
MCV RBC AUTO: 77 FL (ref 80–100)
MONOCYTES # BLD AUTO: 0.56 X10*3/UL (ref 0.1–1)
MONOCYTES NFR BLD AUTO: 4.3 %
MUCOUS THREADS #/AREA URNS AUTO: ABNORMAL /LPF
NEUTROPHILS # BLD AUTO: 8.95 X10*3/UL (ref 1.2–7.7)
NEUTROPHILS NFR BLD AUTO: 68.2 %
NITRITE UR QL STRIP.AUTO: NEGATIVE
NRBC BLD-RTO: 0 /100 WBCS (ref 0–0)
PH UR STRIP.AUTO: 5.5 [PH]
PLATELET # BLD AUTO: 303 X10*3/UL (ref 150–450)
POTASSIUM SERPL-SCNC: 3.8 MMOL/L (ref 3.5–5.3)
PROT SERPL-MCNC: 7.1 G/DL (ref 6.4–8.2)
PROT UR STRIP.AUTO-MCNC: ABNORMAL MG/DL
RBC # BLD AUTO: 4.13 X10*6/UL (ref 4–5.2)
RBC # UR STRIP.AUTO: NEGATIVE /UL
RBC #/AREA URNS AUTO: ABNORMAL /HPF
SODIUM SERPL-SCNC: 136 MMOL/L (ref 136–145)
SP GR UR STRIP.AUTO: 1.03
SQUAMOUS #/AREA URNS AUTO: ABNORMAL /HPF
UROBILINOGEN UR STRIP.AUTO-MCNC: NORMAL MG/DL
WBC # BLD AUTO: 13.1 X10*3/UL (ref 4.4–11.3)
WBC #/AREA URNS AUTO: ABNORMAL /HPF

## 2024-12-20 PROCEDURE — 74177 CT ABD & PELVIS W/CONTRAST: CPT | Performed by: RADIOLOGY

## 2024-12-20 PROCEDURE — 74177 CT ABD & PELVIS W/CONTRAST: CPT

## 2024-12-20 PROCEDURE — 2550000001 HC RX 255 CONTRASTS: Mod: SE

## 2024-12-20 RX ORDER — DICYCLOMINE HYDROCHLORIDE 10 MG/1
10 CAPSULE ORAL 3 TIMES DAILY PRN
Qty: 15 CAPSULE | Refills: 0 | Status: SHIPPED | OUTPATIENT
Start: 2024-12-20 | End: 2024-12-20

## 2024-12-20 RX ORDER — DICYCLOMINE HYDROCHLORIDE 10 MG/1
10 CAPSULE ORAL 3 TIMES DAILY PRN
Qty: 15 CAPSULE | Refills: 0 | Status: SHIPPED | OUTPATIENT
Start: 2024-12-20

## 2024-12-20 RX ADMIN — IOHEXOL 90 ML: 350 INJECTION, SOLUTION INTRAVENOUS at 00:32

## 2024-12-20 NOTE — DISCHARGE INSTRUCTIONS
Labs look okay.  CT scan is negative for any acute findings for abdominal pain  Trial of the Bentyl to see if it helps with your pain.  If symptoms persist, follow-up with primary care

## 2024-12-20 NOTE — ED PROVIDER NOTES
HPI   Chief Complaint   Patient presents with    Flank Pain       34-year-old female with no significant past medical history presenting today for right sided abdominal pain.  He notes that has been present for 2 weeks waxing waning but over the past 5 days, has dramatically gotten worse.  Describes it as a pulsating cramping pain to the right side of her abdomen.  Reports that in the right upper and right lower abdomen.  Denies any nausea or vomiting.  Denies diarrhea or constipation.  Last bowel movement this morning.  Reports fever, chills, chest pain, shortness of breath, urinary symptoms including dysuria, hematuria or increased urinary frequency.  States no sick contacts.  States that the pain is constant and worse when she lays on that side.  Denies any flank pain or back pain.              Patient History   Past Medical History:   Diagnosis Date    Encounter for full-term uncomplicated delivery ([facility])      (spontaneous vaginal delivery)     No past surgical history on file.  Family History   Problem Relation Name Age of Onset    Diabetes Mother      Hypertension Mother       Social History     Tobacco Use    Smoking status: Never    Smokeless tobacco: Never   Vaping Use    Vaping status: Never Used   Substance Use Topics    Alcohol use: Never    Drug use: Never       Physical Exam   ED Triage Vitals [24 2225]   Temperature Heart Rate Respirations BP   36.7 °C (98.1 °F) 93 16 127/77      Pulse Ox Temp src Heart Rate Source Patient Position   98 % -- Monitor Sitting      BP Location FiO2 (%)     Left arm --       Physical Exam  Vitals and nursing note reviewed.   Constitutional:       General: She is not in acute distress.     Appearance: Normal appearance. She is not ill-appearing.   HENT:      Head: Normocephalic and atraumatic.      Right Ear: External ear normal.      Left Ear: External ear normal.      Nose: Nose normal. No congestion or rhinorrhea.      Mouth/Throat:      Mouth: Mucous  membranes are moist.      Pharynx: Oropharynx is clear. No oropharyngeal exudate or posterior oropharyngeal erythema.   Eyes:      Extraocular Movements: Extraocular movements intact.      Conjunctiva/sclera: Conjunctivae normal.      Pupils: Pupils are equal, round, and reactive to light.   Cardiovascular:      Rate and Rhythm: Normal rate and regular rhythm.      Heart sounds: Normal heart sounds.   Pulmonary:      Effort: No accessory muscle usage or respiratory distress.      Breath sounds: Normal breath sounds. No wheezing, rhonchi or rales.   Abdominal:      General: Abdomen is flat. Bowel sounds are normal. There is no distension.      Palpations: Abdomen is soft.      Tenderness: There is abdominal tenderness in the right upper quadrant and right lower quadrant. There is no right CVA tenderness or left CVA tenderness.   Musculoskeletal:         General: No swelling or deformity. Normal range of motion.      Cervical back: Normal range of motion and neck supple.      Right lower leg: No edema.      Left lower leg: No edema.   Skin:     General: Skin is warm and dry.      Capillary Refill: Capillary refill takes less than 2 seconds.   Neurological:      General: No focal deficit present.      Mental Status: She is alert and oriented to person, place, and time.      GCS: GCS eye subscore is 4. GCS verbal subscore is 5. GCS motor subscore is 6.      Cranial Nerves: Cranial nerves 2-12 are intact.      Sensory: No sensory deficit.      Motor: Motor function is intact. No weakness.   Psychiatric:         Mood and Affect: Mood and affect normal.         Speech: Speech normal.         Behavior: Behavior normal. Behavior is cooperative.           ED Course & MDM   ED Course as of 12/20/24 0314   Fri Dec 20, 2024   0037 Preg Test, Ur: Negative [ML]   0057 CT abdomen pelvis w IV contrast  1. No acute abnormality within the abdomen or pelvis.   [ML]      ED Course User Index  [ML] Nava Pressley PA-C         Diagnoses as  of 12/20/24 0314   Right lateral abdominal pain                 No data recorded                                 Medical Decision Making  34-year-old female presenting today for right sided abdominal pain.  Has been ongoing for 2 weeks but worse for the past 4 to 5 days.  On exam, abdomen is tender to palpation right upper and right lower abdomen.  Vital signs are stable and she is afebrile.  No rigidity or guarding.  No flank pain/CVA tenderness.  Patient denies any nausea vomiting diarrhea constipation.  Last bowel movement today.  Considering symptoms have been ongoing for the past 2 weeks, I do have lower suspicion for acute abdomen though considering she is tender to palpation, we will proceed with a CT scan of her stomach to rule out acute abdomen.  Patient was given Bentyl for pain control.    CBC with white count but no acute anemia noted.  CMP without electrolyte abnormalities, transaminitis or acute anemia.  UA showing leukocyte Estrace and trace white blood cells in urine.  No nitrites.  Denies any symptoms.  Will defer treatment until culture comes back.  Lipase within normal limits.  Patient's pregnancy test is negative.  CT scan of abdomen pelvis stat for hepatomegaly.  LFTs within normal limits.  Plan for this patient to be discharged home with Bentyl prescription and follow-up with PCP.        Procedure  Procedures     Nava Pressley PA-C  12/20/24 0314

## 2024-12-20 NOTE — ED TRIAGE NOTES
Patient reports right flank pain that has been going on for 2 weeks. Describes it as 7/10 constant pulsating. Denies any N/V/D, fevers, chills, CP, SOB. Also denies any urinary symptoms.

## 2024-12-21 LAB — BACTERIA UR CULT: ABNORMAL

## 2024-12-23 LAB — BACTERIA UR CULT: ABNORMAL

## 2024-12-24 ENCOUNTER — TELEPHONE (OUTPATIENT)
Dept: PHARMACY | Facility: HOSPITAL | Age: 34
End: 2024-12-24
Payer: COMMERCIAL

## 2024-12-24 NOTE — PROGRESS NOTES
EDPD Note: Rapid Result Review    I reviewed Fatou Go 's chart regarding a positive urine culture/result that was taken during their recent emergency room visit. The patient was not told about these results prior to leaving the emergency department. Therefore, patient was contacted and given appropriate education.     Patient presented to the ED for flank pain. Denied urinary symptoms. Discharged without antibiotics. At time of call, patient denied urinary symptoms. Patient was asymptomatic in ED and still reports no urinary symptoms at the time of call. Since asymptomatic UTI, patient is not indicated for antibiotic at this time. Advised patient to follow up with PCP or urgent care if urinary symptoms due arise.     Susceptibility data from last 90 days.  Collected Specimen Info Organism Ampicillin Ciprofloxacin Levofloxacin Nitrofurantoin Penicillin Trimethoprim/Sulfamethoxazole Vancomycin   12/19/24 Urine from Clean Catch/Voided Enterococcus faecalis  S  S  S  S  S  R  S     Admission on 12/19/2024, Discharged on 12/20/2024   Component Date Value Ref Range Status    WBC 12/19/2024 13.1 (H)  4.4 - 11.3 x10*3/uL Final    nRBC 12/19/2024 0.0  0.0 - 0.0 /100 WBCs Final    RBC 12/19/2024 4.13  4.00 - 5.20 x10*6/uL Final    Hemoglobin 12/19/2024 10.3 (L)  12.0 - 16.0 g/dL Final    Hematocrit 12/19/2024 31.7 (L)  36.0 - 46.0 % Final    MCV 12/19/2024 77 (L)  80 - 100 fL Final    MCH 12/19/2024 24.9 (L)  26.0 - 34.0 pg Final    MCHC 12/19/2024 32.5  32.0 - 36.0 g/dL Final    RDW 12/19/2024 14.9 (H)  11.5 - 14.5 % Final    Platelets 12/19/2024 303  150 - 450 x10*3/uL Final    Neutrophils % 12/19/2024 68.2  40.0 - 80.0 % Final    Immature Granulocytes %, Automated 12/19/2024 0.8  0.0 - 0.9 % Final    Immature Granulocyte Count (IG) includes promyelocytes, myelocytes and metamyelocytes but does not include bands. Percent differential counts (%) should be interpreted in the context of the absolute cell counts  (cells/UL).    Lymphocytes % 12/19/2024 24.8  13.0 - 44.0 % Final    Monocytes % 12/19/2024 4.3  2.0 - 10.0 % Final    Eosinophils % 12/19/2024 1.4  0.0 - 6.0 % Final    Basophils % 12/19/2024 0.5  0.0 - 2.0 % Final    Neutrophils Absolute 12/19/2024 8.95 (H)  1.20 - 7.70 x10*3/uL Final    Percent differential counts (%) should be interpreted in the context of the absolute cell counts (cells/uL).    Immature Granulocytes Absolute, Au* 12/19/2024 0.11  0.00 - 0.70 x10*3/uL Final    Lymphocytes Absolute 12/19/2024 3.25  1.20 - 4.80 x10*3/uL Final    Monocytes Absolute 12/19/2024 0.56  0.10 - 1.00 x10*3/uL Final    Eosinophils Absolute 12/19/2024 0.19  0.00 - 0.70 x10*3/uL Final    Basophils Absolute 12/19/2024 0.06  0.00 - 0.10 x10*3/uL Final    Glucose 12/19/2024 167 (H)  74 - 99 mg/dL Final    Sodium 12/19/2024 136  136 - 145 mmol/L Final    Potassium 12/19/2024 3.8  3.5 - 5.3 mmol/L Final    Chloride 12/19/2024 105  98 - 107 mmol/L Final    Bicarbonate 12/19/2024 26  21 - 32 mmol/L Final    Anion Gap 12/19/2024 9 (L)  10 - 20 mmol/L Final    Urea Nitrogen 12/19/2024 9  6 - 23 mg/dL Final    Creatinine 12/19/2024 0.70  0.50 - 1.05 mg/dL Final    eGFR 12/19/2024 >90  >60 mL/min/1.73m*2 Final    Calculations of estimated GFR are performed using the 2021 CKD-EPI Study Refit equation without the race variable for the IDMS-Traceable creatinine methods.  https://jasn.asnjournals.org/content/early/2021/09/22/ASN.5098797013    Calcium 12/19/2024 9.0  8.6 - 10.6 mg/dL Final    Albumin 12/19/2024 4.1  3.4 - 5.0 g/dL Final    Alkaline Phosphatase 12/19/2024 82  33 - 110 U/L Final    Total Protein 12/19/2024 7.1  6.4 - 8.2 g/dL Final    AST 12/19/2024 11  9 - 39 U/L Final    Bilirubin, Total 12/19/2024 0.3  0.0 - 1.2 mg/dL Final    ALT 12/19/2024 9  7 - 45 U/L Final    Patients treated with Sulfasalazine may generate falsely decreased results for ALT.    Lipase 12/19/2024 13  9 - 82 U/L Final    Preg Test, Ur 12/19/2024  Negative  Negative Final    Color, Urine 12/19/2024 Yellow  Light-Yellow, Yellow, Dark-Yellow Final    Appearance, Urine 12/19/2024 Turbid (N)  Clear Final    Specific Gravity, Urine 12/19/2024 1.029  1.005 - 1.035 Final    pH, Urine 12/19/2024 5.5  5.0, 5.5, 6.0, 6.5, 7.0, 7.5, 8.0 Final    Protein, Urine 12/19/2024 10 (TRACE)  NEGATIVE, 10 (TRACE), 20 (TRACE) mg/dL Final    Glucose, Urine 12/19/2024 30 (TRACE) (A)  Normal mg/dL Final    Blood, Urine 12/19/2024 NEGATIVE  NEGATIVE Final    Ketones, Urine 12/19/2024 TRACE (A)  NEGATIVE mg/dL Final    Bilirubin, Urine 12/19/2024 NEGATIVE  NEGATIVE Final    Urobilinogen, Urine 12/19/2024 Normal  Normal mg/dL Final    Nitrite, Urine 12/19/2024 NEGATIVE  NEGATIVE Final    Leukocyte Esterase, Urine 12/19/2024 250 Yao/µL (A)  NEGATIVE Final    Extra Tube 12/19/2024 Hold for add-ons.   Final    Auto resulted.    Extra Tube 12/20/2024 Hold for add-ons.   Final    Auto resulted.    WBC, Urine 12/19/2024 11-20 (A)  1-5, NONE /HPF Final    RBC, Urine 12/19/2024 1-2  NONE, 1-2, 3-5 /HPF Final    Squamous Epithelial Cells, Urine 12/19/2024 10-25 (FEW)  Reference range not established. /HPF Final    Mucus, Urine 12/19/2024 4+  Reference range not established. /LPF Final    Urine Culture 12/19/2024 20,000 - 80,000 CFU/mL Enterococcus faecalis (A)   Final       No further follow up needed from EDPD Team.     If there are any other questions for the ED Post-Discharge Culture Follow Up Team, please contact [phone]. Fax: [phone].    Etta Petty, LashawnD

## 2025-09-05 ENCOUNTER — APPOINTMENT (OUTPATIENT)
Dept: PRIMARY CARE | Facility: CLINIC | Age: 35
End: 2025-09-05
Payer: COMMERCIAL

## 2025-10-10 ENCOUNTER — APPOINTMENT (OUTPATIENT)
Dept: PRIMARY CARE | Facility: CLINIC | Age: 35
End: 2025-10-10
Payer: COMMERCIAL